# Patient Record
Sex: MALE | Race: BLACK OR AFRICAN AMERICAN | NOT HISPANIC OR LATINO | Employment: UNEMPLOYED | URBAN - METROPOLITAN AREA
[De-identification: names, ages, dates, MRNs, and addresses within clinical notes are randomized per-mention and may not be internally consistent; named-entity substitution may affect disease eponyms.]

---

## 2020-11-11 ENCOUNTER — SOCIAL WORK (OUTPATIENT)
Dept: TRANSPLANT | Facility: CLINIC | Age: 30
End: 2020-11-11

## 2020-11-11 NOTE — PROGRESS NOTES
Pt has been referred for transplant eval.  Letter sent to dialysis unit for clarification/quantification of documented noncompliance.

## 2020-11-13 ENCOUNTER — TELEPHONE (OUTPATIENT)
Dept: TRANSPLANT | Facility: CLINIC | Age: 30
End: 2020-11-13

## 2020-11-13 NOTE — TELEPHONE ENCOUNTER
"Compliance check:  Dialysis unit reports in the past three months pt has had 6 no shows, 4 AMAs.   Additional no shows from July 2020-and early August: 4.  Reasons given for all 10 most recent no shows:  "transportation x1, refused ride/transportation x4, no answer x2, not feeling good x1, weather x1 and 'came, weighed and left' x1."    Labs , Labs listed as concern:  "PO4, sugar and potassium".    Transportation/family support: "Pt lives with mother and brother-neither drive.  Pt relies on [employer] transportation and doesn't always come to tx even when transportation comes."     Psychiatric concerns:  "Pt has anxiety and smokes marijuana to cope and/or takes anxiety meds prescribed by PCP".      Reported by [facility] via fax back sheet    "

## 2020-11-19 ENCOUNTER — DOCUMENTATION ONLY (OUTPATIENT)
Dept: TRANSPLANT | Facility: CLINIC | Age: 30
End: 2020-11-19

## 2024-06-28 ENCOUNTER — TELEPHONE (OUTPATIENT)
Dept: TRANSPLANT | Facility: CLINIC | Age: 34
End: 2024-06-28
Payer: MEDICAID

## 2024-07-02 ENCOUNTER — DOCUMENTATION ONLY (OUTPATIENT)
Dept: TRANSPLANT | Facility: CLINIC | Age: 34
End: 2024-07-02
Payer: MEDICAID